# Patient Record
Sex: MALE | Race: BLACK OR AFRICAN AMERICAN | Employment: UNEMPLOYED | URBAN - METROPOLITAN AREA
[De-identification: names, ages, dates, MRNs, and addresses within clinical notes are randomized per-mention and may not be internally consistent; named-entity substitution may affect disease eponyms.]

---

## 2024-07-18 ENCOUNTER — OFFICE VISIT (OUTPATIENT)
Age: 59
End: 2024-07-18
Payer: OTHER GOVERNMENT

## 2024-07-18 VITALS
HEIGHT: 72 IN | DIASTOLIC BLOOD PRESSURE: 90 MMHG | OXYGEN SATURATION: 97 % | WEIGHT: 281.2 LBS | HEART RATE: 61 BPM | SYSTOLIC BLOOD PRESSURE: 148 MMHG | BODY MASS INDEX: 38.09 KG/M2

## 2024-07-18 DIAGNOSIS — I10 PRIMARY HYPERTENSION: Primary | ICD-10-CM

## 2024-07-18 DIAGNOSIS — I50.32 CHRONIC DIASTOLIC (CONGESTIVE) HEART FAILURE (HCC): ICD-10-CM

## 2024-07-18 DIAGNOSIS — R01.1 SYSTOLIC MURMUR: ICD-10-CM

## 2024-07-18 DIAGNOSIS — R09.89 BRUIT OF RIGHT CAROTID ARTERY: ICD-10-CM

## 2024-07-18 DIAGNOSIS — E66.09 CLASS 2 OBESITY DUE TO EXCESS CALORIES WITHOUT SERIOUS COMORBIDITY WITH BODY MASS INDEX (BMI) OF 37.0 TO 37.9 IN ADULT: ICD-10-CM

## 2024-07-18 DIAGNOSIS — R94.31 ABNORMAL ECG: ICD-10-CM

## 2024-07-18 DIAGNOSIS — G47.33 OBSTRUCTIVE SLEEP APNEA: ICD-10-CM

## 2024-07-18 PROCEDURE — 3077F SYST BP >= 140 MM HG: CPT | Performed by: INTERNAL MEDICINE

## 2024-07-18 PROCEDURE — 3080F DIAST BP >= 90 MM HG: CPT | Performed by: INTERNAL MEDICINE

## 2024-07-18 PROCEDURE — 99215 OFFICE O/P EST HI 40 MIN: CPT | Performed by: INTERNAL MEDICINE

## 2024-07-18 RX ORDER — NIFEDIPINE 60 MG/1
60 TABLET, EXTENDED RELEASE ORAL DAILY
Qty: 90 TABLET | Refills: 3 | Status: SHIPPED | OUTPATIENT
Start: 2024-07-18

## 2024-07-18 ASSESSMENT — ENCOUNTER SYMPTOMS
SHORTNESS OF BREATH: 1
GASTROINTESTINAL NEGATIVE: 1
ALLERGIC/IMMUNOLOGIC NEGATIVE: 1
EYES NEGATIVE: 1

## 2024-07-18 NOTE — PROGRESS NOTES
Cal Rhodes (:  1965) is a 58 y.o. male,Established patient, here for evaluation of the following chief complaint(s):  Follow-up    Subjective   SUBJECTIVE/OBJECTIVE:  HPI  Patient presents today for follow-up. He had been followed by the [facility], but now has his own primary care physician. He was told in the past that he has cardiomegaly,but the details of this are uncertain. He is on heart failure/blood pressure medications as well.           Today, he is doing quite well. He has no acute complaints. He has experienced no symptoms of shortness of breath or chest discomfort. He does have sleep apnea for which he is using his CPAP device diligently. He has experienced minimal to no palpitations or tachycardia. No syncope or presyncope. No lightheadedness or dizziness. No orthopnea or paroxysmal nocturnal dyspnea. Patient has a tobacco use history, but not currently. He has no history of known coronary disease or heart failure. His blood pressure is elevated today. Cardiomegaly noted on last echocardiogram. He does have symptoms of leg pain which could be indicative of thrombus or occlusion. His symptoms are not necessarily specific for either, but he does have symptoms of both.           I personally reviewed all available medical records, previous office notes, radiology reports and all available laboratory studies and procedural reports.    Past Medical History:   Diagnosis Date    Cardiomyopathy (HCC)     Hypertension 2007    Sleep apnea         Past Surgical History:   Procedure Laterality Date    CHOLECYSTECTOMY          No Known Allergies     Current Outpatient Medications   Medication Sig Dispense Refill    Ferrous Fumarate 325 (106 Fe) MG TABS Take 325 mg by mouth      NIFEdipine (PROCARDIA XL) 60 MG extended release tablet Take 1 tablet by mouth daily 90 tablet 3    vitamin D (CHOLECALCIFEROL) 125 MCG (5000 UT) CAPS capsule Take by mouth      gabapentin (NEURONTIN) 300 MG capsule TAKE

## 2024-07-18 NOTE — PROGRESS NOTES
1. \"Have you been to the ER, urgent care clinic since your last visit?  Hospitalized since your last visit?\" Reviewed by Dr. Hector Clements    2. \"Have you seen or consulted any other health care providers outside of the [facility] since your last visit?\" Reviewed by Dr. Hector Clements

## 2024-11-01 RX ORDER — LABETALOL 200 MG/1
200 TABLET, FILM COATED ORAL 2 TIMES DAILY
Qty: 90 TABLET | Refills: 3 | Status: SHIPPED | OUTPATIENT
Start: 2024-11-01

## 2024-11-01 NOTE — TELEPHONE ENCOUNTER
PCP: Jm Martinez III, MD    Last appt:  7/18/2024   Future Appointments   Date Time Provider Department Center   12/2/2024 10:10 AM [facility] NURSE [facility] Sched   12/10/2024  8:45 AM Luis Hickman MD [facility] Sched   7/21/2025  9:30 AM BS [facility]F ECHO 1 [facility] AMB   7/21/2025 10:15 AM Hector Clements Sr., MD [facility] BS AMB       Requested Prescriptions     Pending Prescriptions Disp Refills    labetalol (NORMODYNE) 200 MG tablet 90 tablet 3     Sig: Take 1 tablet by mouth 2 times daily       Request for a 30 or 90 day supply? Provider Discretion    Pharmacy: CONFIRMED    Other Comments: N/A

## 2025-04-07 RX ORDER — LABETALOL 200 MG/1
200 TABLET, FILM COATED ORAL 2 TIMES DAILY
Qty: 180 TABLET | Refills: 3 | Status: SHIPPED | OUTPATIENT
Start: 2025-04-07

## 2025-04-07 NOTE — TELEPHONE ENCOUNTER
PCP: Jm Martinez III, MD    Last appt:  Visit date not found   Future Appointments   Date Time Provider Department Center   7/21/2025  9:30 AM BS [facility]F ECHO 1 [facility] BS [facility]   7/21/2025 10:15 AM Hector Clements Sr., MD [facility] BS [facility]   12/2/2025  9:30 AM [facility] NURSE [facility] Sched   12/9/2025  8:30 AM Luis Hickman MD [facility] Sched       Requested Prescriptions     Pending Prescriptions Disp Refills    labetalol (NORMODYNE) 200 MG tablet [Pharmacy Med Name: LABETALOL 200MG TABLETS] 180 tablet 3     Sig: TAKE 1 TABLET BY MOUTH TWICE DAILY       Request for a 30 or 90 day supply? Provider Discretion    Pharmacy: CONFIRMED    Other Comments: N/A

## 2025-05-30 NOTE — TELEPHONE ENCOUNTER
PCP: Jm Martinez III, MD    Last appt:  7/18/2024   Future Appointments   Date Time Provider Department Center   7/21/2025  9:30 AM BS [facility]F ECHO 1 [facility] BS [facility]   7/21/2025 10:15 AM Hector Clements Sr., MD [facility] BS [facility]   12/2/2025  9:30 AM [facility] NURSE [facility] Sched   12/9/2025  8:30 AM Luis Hickman MD [facility] Sched       Requested Prescriptions     Pending Prescriptions Disp Refills    NIFEdipine (PROCARDIA XL) 60 MG extended release tablet 90 tablet 3     Sig: Take 1 tablet by mouth daily       Request for a 30 or 90 day supply? Provider Discretion    Pharmacy: confirmed    Other Comments:n/a

## 2025-06-08 RX ORDER — NIFEDIPINE 60 MG/1
60 TABLET, EXTENDED RELEASE ORAL DAILY
Qty: 90 TABLET | Refills: 3 | Status: SHIPPED | OUTPATIENT
Start: 2025-06-08

## 2025-07-09 DIAGNOSIS — I10 PRIMARY HYPERTENSION: ICD-10-CM

## 2025-07-09 DIAGNOSIS — I50.32 CHRONIC DIASTOLIC (CONGESTIVE) HEART FAILURE (HCC): Primary | ICD-10-CM

## 2025-07-09 DIAGNOSIS — R01.1 SYSTOLIC MURMUR: ICD-10-CM

## 2025-07-09 DIAGNOSIS — R94.31 ABNORMAL ECG: ICD-10-CM

## 2025-07-21 ENCOUNTER — OFFICE VISIT (OUTPATIENT)
Age: 60
End: 2025-07-21
Payer: OTHER GOVERNMENT

## 2025-07-21 VITALS
BODY MASS INDEX: 38.6 KG/M2 | HEART RATE: 70 BPM | WEIGHT: 285 LBS | TEMPERATURE: 97 F | DIASTOLIC BLOOD PRESSURE: 71 MMHG | OXYGEN SATURATION: 97 % | HEIGHT: 72 IN | SYSTOLIC BLOOD PRESSURE: 136 MMHG

## 2025-07-21 DIAGNOSIS — R09.89 BRUIT OF RIGHT CAROTID ARTERY: ICD-10-CM

## 2025-07-21 DIAGNOSIS — E66.812 CLASS 2 OBESITY DUE TO EXCESS CALORIES WITHOUT SERIOUS COMORBIDITY WITH BODY MASS INDEX (BMI) OF 38.0 TO 38.9 IN ADULT: ICD-10-CM

## 2025-07-21 DIAGNOSIS — R94.31 ABNORMAL ECG: ICD-10-CM

## 2025-07-21 DIAGNOSIS — I50.32 CHRONIC DIASTOLIC (CONGESTIVE) HEART FAILURE (HCC): Primary | ICD-10-CM

## 2025-07-21 DIAGNOSIS — E66.09 CLASS 2 OBESITY DUE TO EXCESS CALORIES WITHOUT SERIOUS COMORBIDITY WITH BODY MASS INDEX (BMI) OF 38.0 TO 38.9 IN ADULT: ICD-10-CM

## 2025-07-21 DIAGNOSIS — I51.7 LVH (LEFT VENTRICULAR HYPERTROPHY): ICD-10-CM

## 2025-07-21 DIAGNOSIS — R01.1 SYSTOLIC MURMUR: ICD-10-CM

## 2025-07-21 DIAGNOSIS — G47.33 OBSTRUCTIVE SLEEP APNEA: ICD-10-CM

## 2025-07-21 DIAGNOSIS — I10 PRIMARY HYPERTENSION: ICD-10-CM

## 2025-07-21 PROCEDURE — 3075F SYST BP GE 130 - 139MM HG: CPT | Performed by: INTERNAL MEDICINE

## 2025-07-21 PROCEDURE — 3078F DIAST BP <80 MM HG: CPT | Performed by: INTERNAL MEDICINE

## 2025-07-21 PROCEDURE — 99215 OFFICE O/P EST HI 40 MIN: CPT | Performed by: INTERNAL MEDICINE

## 2025-07-21 RX ORDER — NIFEDIPINE 60 MG/1
60 TABLET, EXTENDED RELEASE ORAL DAILY
Qty: 90 TABLET | Refills: 3 | Status: SHIPPED | OUTPATIENT
Start: 2025-07-21

## 2025-07-21 RX ORDER — LABETALOL 200 MG/1
200 TABLET, FILM COATED ORAL 2 TIMES DAILY
Qty: 180 TABLET | Refills: 3 | Status: SHIPPED | OUTPATIENT
Start: 2025-07-21

## 2025-07-21 RX ORDER — FLUTICASONE PROPIONATE 50 MCG
SPRAY, SUSPENSION (ML) NASAL
COMMUNITY
Start: 2025-05-20

## 2025-07-21 RX ORDER — KETOTIFEN FUMARATE 0.35 MG/ML
SOLUTION/ DROPS OPHTHALMIC AS NEEDED
COMMUNITY
Start: 2025-05-20

## 2025-07-21 RX ORDER — LORATADINE 10 MG/1
TABLET ORAL
COMMUNITY
Start: 2025-05-20

## 2025-07-21 ASSESSMENT — PATIENT HEALTH QUESTIONNAIRE - PHQ9
SUM OF ALL RESPONSES TO PHQ QUESTIONS 1-9: 0
2. FEELING DOWN, DEPRESSED OR HOPELESS: NOT AT ALL
SUM OF ALL RESPONSES TO PHQ QUESTIONS 1-9: 0
1. LITTLE INTEREST OR PLEASURE IN DOING THINGS: NOT AT ALL

## 2025-07-21 ASSESSMENT — ENCOUNTER SYMPTOMS
EYES NEGATIVE: 1
SHORTNESS OF BREATH: 1
GASTROINTESTINAL NEGATIVE: 1
ALLERGIC/IMMUNOLOGIC NEGATIVE: 1

## 2025-07-21 NOTE — PROGRESS NOTES
1. \"Have you been to the ER, urgent care clinic since your last visit?  Hospitalized since your last visit?\" Reviewed by Dr. Hector Clements    2. \"Have you seen or consulted any other health care providers outside of the [facility] since your last visit?\" Reviewed by Dr. Hector Clements

## 2025-07-21 NOTE — PROGRESS NOTES
Cal Rhodes (:  1965) is a 59 y.o. male,Established patient, here for evaluation of the following chief complaint(s):  1 Year Follow Up      Subjective   SUBJECTIVE/OBJECTIVE:  History of Present Illness  Patient presents today for follow-up. He had been followed by the [facility], but now has his own primary care physician. He was told in the past that he has cardiomegaly,but the details of this are uncertain. He is on heart failure/blood pressure medications as well. He does have sleep apnea for which he is using his CPAP device diligently. Patient has a tobacco use history, but not currently. He has no history of known coronary disease or heart failure.    He reports no chest discomfort, shortness of breath, or dizziness. Physical activity is limited to yard work, which he has been able to perform without needing frequent breaks. Blood pressure monitoring at home typically ranges between 122 and 140 systolic. Dietary changes include reducing salt intake. He has noticed a decrease in ankle swelling compared to previous instances. Blood pressure was consistently around 145 to 147 when he first started seeing this provider. Readings vary depending on the time of day and whether medication has been taken. In the morning, blood pressure may be in the 140s over 80s if medication has not yet been taken, but it usually decreases to the 130s after medication. Occasional attempts to walk for exercise are challenging due to knee issues.    He uses a CPAP machine nightly.    He is under the care of Dr. Zelaya for thyroid enlargement.    SOCIAL HISTORY  Exercise: Yard work  Diet: Does not add salt to food    I have carefully reviewed all available medical records, previous office notes, lab, x-ray and procedure reports    Past Medical History:   Diagnosis Date    Cardiomyopathy (HCC) 2007    Hypertension 2007    Sleep apnea         Past Surgical History:   Procedure Laterality Date    CHOLECYSTECTOMY